# Patient Record
Sex: FEMALE | Race: WHITE
[De-identification: names, ages, dates, MRNs, and addresses within clinical notes are randomized per-mention and may not be internally consistent; named-entity substitution may affect disease eponyms.]

---

## 2020-11-25 LAB
ADD MANUAL DIFF: NO
APPEARANCE UR: (no result)
APTT PPP: YELLOW S
BARBITURATES UR QL SCN: NEGATIVE
BASOPHILS # BLD AUTO: 0 10^3/UL (ref 0–0.2)
BASOPHILS NFR BLD AUTO: 0.4 % (ref 0–2)
BILIRUB UR QL STRIP: NEGATIVE
EOSINOPHIL # BLD AUTO: 0.1 10^3/UL (ref 0–0.6)
EOSINOPHIL NFR BLD AUTO: 0.7 % (ref 0–6)
ERYTHROCYTE [DISTWIDTH] IN BLOOD BY AUTOMATED COUNT: 14.5 % (ref 11.5–14)
GLUCOSE UR STRIP-MCNC: NEGATIVE MG/DL
HCT VFR BLD CALC: 31.1 % (ref 36–47)
HGB BLD-MCNC: 10.5 G/DL (ref 12–15.5)
KETONES UR STRIP-MCNC: (no result) MG/DL
LYMPHOCYTES # BLD AUTO: 1.8 10^3/UL (ref 0.5–4.7)
LYMPHOCYTES NFR BLD AUTO: 19.6 % (ref 13–45)
MCH RBC QN AUTO: 26.7 PG (ref 27–33.4)
MCHC RBC AUTO-ENTMCNC: 33.8 G/DL (ref 32–36)
MCV RBC AUTO: 79 FL (ref 80–97)
METHADONE UR QL SCN: NEGATIVE
MONOCYTES # BLD AUTO: 0.6 10^3/UL (ref 0.1–1.4)
MONOCYTES NFR BLD AUTO: 6.3 % (ref 3–13)
NEUTROPHILS # BLD AUTO: 6.7 10^3/UL (ref 1.7–8.2)
NEUTS SEG NFR BLD AUTO: 73 % (ref 42–78)
NITRITE UR QL STRIP: NEGATIVE
PCP UR QL SCN: NEGATIVE
PH UR STRIP: 5 [PH] (ref 5–9)
PLATELET # BLD: 235 10^3/UL (ref 150–450)
PROT UR STRIP-MCNC: 30 MG/DL
RBC # BLD AUTO: 3.93 10^6/UL (ref 3.72–5.28)
SP GR UR STRIP: 1.04
TOTAL CELLS COUNTED % (AUTO): 100 %
URINE AMPHETAMINES SCREEN: NEGATIVE
URINE BENZODIAZEPINES SCREEN: NEGATIVE
URINE COCAINE SCREEN: NEGATIVE
URINE MARIJUANA (THC) SCREEN: NEGATIVE
UROBILINOGEN UR-MCNC: NEGATIVE MG/DL (ref ?–2)
WBC # BLD AUTO: 9.2 10^3/UL (ref 4–10.5)

## 2020-12-01 ENCOUNTER — HOSPITAL ENCOUNTER (INPATIENT)
Dept: HOSPITAL 62 - 2S | Age: 30
LOS: 2 days | Discharge: HOME | End: 2020-12-03
Attending: OBSTETRICS & GYNECOLOGY | Admitting: OBSTETRICS & GYNECOLOGY
Payer: OTHER GOVERNMENT

## 2020-12-01 DIAGNOSIS — K21.9: ICD-10-CM

## 2020-12-01 DIAGNOSIS — A60.00: ICD-10-CM

## 2020-12-01 DIAGNOSIS — M41.9: ICD-10-CM

## 2020-12-01 DIAGNOSIS — D64.9: ICD-10-CM

## 2020-12-01 DIAGNOSIS — Z79.899: ICD-10-CM

## 2020-12-01 DIAGNOSIS — Z23: ICD-10-CM

## 2020-12-01 DIAGNOSIS — Z3A.39: ICD-10-CM

## 2020-12-01 DIAGNOSIS — Z20.828: ICD-10-CM

## 2020-12-01 PROCEDURE — 86901 BLOOD TYPING SEROLOGIC RH(D): CPT

## 2020-12-01 PROCEDURE — 81001 URINALYSIS AUTO W/SCOPE: CPT

## 2020-12-01 PROCEDURE — 86900 BLOOD TYPING SEROLOGIC ABO: CPT

## 2020-12-01 PROCEDURE — 86850 RBC ANTIBODY SCREEN: CPT

## 2020-12-01 PROCEDURE — 94760 N-INVAS EAR/PLS OXIMETRY 1: CPT

## 2020-12-01 PROCEDURE — 87635 SARS-COV-2 COVID-19 AMP PRB: CPT

## 2020-12-01 PROCEDURE — 85027 COMPLETE CBC AUTOMATED: CPT

## 2020-12-01 PROCEDURE — C9803 HOPD COVID-19 SPEC COLLECT: HCPCS

## 2020-12-01 PROCEDURE — 80307 DRUG TEST PRSMV CHEM ANLYZR: CPT

## 2020-12-01 PROCEDURE — 94799 UNLISTED PULMONARY SVC/PX: CPT

## 2020-12-01 PROCEDURE — 59025 FETAL NON-STRESS TEST: CPT

## 2020-12-01 PROCEDURE — 36415 COLL VENOUS BLD VENIPUNCTURE: CPT

## 2020-12-01 PROCEDURE — 85025 COMPLETE CBC W/AUTO DIFF WBC: CPT

## 2020-12-01 PROCEDURE — 90707 MMR VACCINE SC: CPT

## 2020-12-01 RX ADMIN — KETOROLAC TROMETHAMINE SCH MG: 30 INJECTION, SOLUTION INTRAMUSCULAR at 13:45

## 2020-12-01 RX ADMIN — KETOROLAC TROMETHAMINE SCH MG: 30 INJECTION, SOLUTION INTRAMUSCULAR at 23:58

## 2020-12-01 RX ADMIN — OXYCODONE AND ACETAMINOPHEN PRN TAB: 5; 325 TABLET ORAL at 17:33

## 2020-12-01 RX ADMIN — DOCUSATE SODIUM SCH MG: 100 CAPSULE, LIQUID FILLED ORAL at 17:33

## 2020-12-01 NOTE — OPERATIVE REPORT
Operative Report


DATE OF SURGERY: 20


PREOPERATIVE DIAGNOSIS: IUP at 39 weeks, history of scoliosis, desires elective 




POSTOPERATIVE DIAGNOSIS: Same


OPERATION: Primary low transverse hysterotomy  section


SURGEON: MYA MENDOSA


ANESTHESIA: Spinal


COMPLICATIONS: 





None


ESTIMATED BLOOD LOSS: 150 cc


INTRAOPERATIVE FINDINGS: Female infant cephalic presentation Apgars 8 and 9


PROCEDURE: 








PROCEDURE IN DETAIL:


The patient was taken to the operating room, prepared and draped in a


normal sterile fashion in a supine position with a leftward tilt. A


transverse skin incision was made with a scalpel and carried through to


the underlying layer of fascia with the same scalpel. The fascia was


excised in the midline and extended laterally with Ralph. The fascia was


then dissected from the rectus muscle sharply with Ralph and the rectus


muscle was divided and the peritoneal cavity was entered sharply with the


same Metzenbaum. With good visualization of the bladder and the uterus


the bladder blade was inserted. The hysterotomy was nicked with a scalpel


and extended laterally with surgeon finger fraction. The infant was then


delivered atraumatically. The nose and mouth were suctioned with a


suction bulb, the cord was clamped and cut and handed off to awaiting


pediatricians. Cord blood was collected. The placenta was removed


manually. The uterus was exteriorized and cleared of clots and debris.


The hysterotomy was closed with 0 Monocryl in a running, locked fashion.


A second layer of the same suture was used to imbricate to ensure


hemostasis. The uterus was returned to the abdomen and peritoneal cavity


was cleared of clots and debris. The rectus muscle and peritoneum were


repaired with mattress stitch of 2-0 Chromic. The fascia was closed with


0-Vicryl. The subcutaneous layer was closed with plain catgut and the


skin was closed with 4-0 Vicryl. The patient tolerated the procedure


well. Sponge, lap, and needle counts correct x2 and the patient was taken


to recovery in stable condition.

## 2020-12-01 NOTE — BIRTH CERTIFICATE DATA
=================================================================

Birth Cert Data

=================================================================

Datetime Report Generated by CPN: 2020 13:59

   

   

=================================================================

BIRTH CERTIFICATE DATA

=================================================================

   

Delivery Provider:  Susie Cody, MD    (2020 12:18:Silvia

   Sales, RN)

   

=================================================================

Mother's Height

=================================================================

   

 50b. Height Inches:  66    (2020 12:53:QS system process)

   

=================================================================

Mother's Weight 

=================================================================

   

 51b. Weight at Delivery (lbs):  189    (2020 12:53:QS system

   process)

   

=================================================================

Onset of Labor

=================================================================

   

 56a. PROM >12 Hrs:  0.05    (2020 12:20:QS system process)

   

=================================================================



=================================================================

   

 57a. Induction of Labor:  N/A    (2020 12:20:Silvia Chino RN)

 57c. Non-Vertex Presentation A:  Vertex    (2020 12:20:Silvia Chino RN)

 57d. Steroids - Fetal Lung Mat:  None    (2020 12:20:Silvia Chino RN)

 57d. Steroids - Fetal Lung Mat:  Not Applicable    (2020

   12:20:Silvia Chino RN)

 57e. Antibiotics During Labor:  2020 10:45    (2020

   12:20:Silvia Chino RN)

 57f. Mat Chorio or Temp >100.4:  97.3    (2020 12:20:Silvia Chino RN)

 57g. Moderate/Heavy Meconium:  Clear    (2020 12:20:Silvia Chino RN)

 57h. Fetal Intolerance of Labor:  Other    (2020 12:20:Silvia Chino RN)

:  Scoliosis, prev. forceps delivery    (2020 12:20:Silvia Chino RN)

:  N/A    (2020 12:20:Silvia Chino RN)

 57i. Epidural/Spinal Anesthesia:  None    (2020 12:20:Silvia Chino RN)

   

=================================================================

Method of Delivery

=================================================================

   

 58a. Forceps - Unsuccessful A:  N/A    (2020 12:20:Silvia Chino RN)

 58b. Vacuum - Unsuccessful A:  Successful    (2020 12:20:Silvia Chino RN)

   

=================================================================

58c. Presentation at Birth

=================================================================

   

 58c. Presentation at Birth - A :  Vertex    (2020 12:20:Silvia

   Sales, RN)

 58c. Presentation at Birth - A :  N/A    (2020 12:20:Silvia

   Sales, RN)

 58c. Presentation at Birth - A :  Cephalic    (2020

   12:20:Silvia Sales, RN)

   

=================================================================

Final Route and Method of Del 

=================================================================

   

 58d. Baby A Route/Delivery:      (2020 12:20:Silvia

   Sales, RN)

 58e. Trial of Labor Attempted:  No    (2020 12:20:Silviasurinder Chino,

   RN)

 58e. Trial of Labor Attempted A:  N/A    (2020 12:20:Silvia

   Sales, RN)

 58e. Trial of Labor Attempted B:  N/A    (2020 12:20:Silvia

   Sales, RN)

   

=================================================================

Maternal Morbidity

=================================================================

   

 59b. 3rd or 4th Degree Lacs:  None    (2020 12:20:Silvia Sales,

   RN)

   

=================================================================



=================================================================

   

 Birthweight Baby A:  3260    (2020 12:20:Silvia Sales, RN)

 60a. Pounds :  7    (2020 12:20:QS system process)

 60b. Ounces:  3    (2020 12:20:QS system process)

   

=================================================================

61. GA at Delivery 

=================================================================

   

 Baby A:  39.0    (2020 12:20:Silvia Sales, RN)

:  Full Term- 39- 40.6 Weeks    (2020 12:20:QS system process)

   

=================================================================

62a. APGAR 5 Minute

=================================================================

   

 Baby A:  9    (2020 12:20:QS system process)

## 2020-12-01 NOTE — DELIVERY SUMMARY
=================================================================

Del Sum A-C

=================================================================

Datetime Report Generated by CPN: 2020 13:59

   

   

=================================================================

DELIVERY PERSONNEL

=================================================================

   

DELIVERY PERSONNEL:  [id number]

Delivery Doctor::  Susie Ross MD

CRNA::  Ted Price CRNA

Circulator::  Silvia Chino RN

 Nurse Practitioner::  STONE Kraft

Nursery Nurse::  Audrey Wren RN

Scrub Tech/CNA:  Tiffanie Marshall CST

Scrub Tech/CNA:  Charito Schuster, ST

   

=================================================================

MATERNAL INFORMATION

=================================================================

   

Delivery Anesthesia:  Spinal

Medications After Delivery:  Pitocin Drip 20 Units/1000ml NSS

Delivery QBL:  475

Maternal Complications:  Other

Complication Details:  Previous forcepts delivery, scoliosis

   

=================================================================

LABOR SUMMARY

=================================================================

   

EDC:  2020 00:00

No. Babies in Womb:  1

 Attempted:  No

Labor Anesthesia:  None

   

=================================================================

LABOR INFORMATION

=================================================================

   

Reason for Induction:  Not Applicable

Oxytocin:  N/A

Group B Beta Strep:  Positive

Antibiotics # of Doses:  1

Antibiotics Time of Last Dose:  2020 10:45

Name of Antibiotic Given:  Anceg 2g

Steroids Given:  None

Reason Steroids Not Administered:  Not Applicable

   

=================================================================

MEMBRANES

=================================================================

   

Membranes Rupture Method:  Artificial

Rupture of Membranes:  2020 11:13

Length of Rupture (hr):  0.05

Amniotic Fluid Color:  Clear

Amniotic Fluid Amount:  Moderate

Amniotic Fluid Odor:  Normal

   

=================================================================

STAGES OF LABOR

=================================================================

   

Stage 3 hr:  0

Stage 3 min:  1

   

=================================================================

VAGINAL DELIVERY

=================================================================

   

Episiotomy:  None

Laceration #1:  None

Laceration Extension #1:  N/A

Laceration Repair:  Not Applicable

Sharps Count Correct:  N/A

   

=================================================================

CSECTION DELIVERY

=================================================================

   

Primary Indication:  Other

Other Primary Indication:  Scoliosis, prev. forceps delivery

Secondary Indication:  N/A

CSection Urgency:  Scheduled

CSection Incidence:  Primary

Labor:  No Labor

Elective:  Elective

CSection Incision:  Lower Uterine Transverse

   

=================================================================

BABY A INFORMATION

=================================================================

   

Infant Delivery Date/Time:  2020 11:16

Method of Delivery:  

Nurse Controlled Delivery:  No

Born in Route :  No

:  N/A

Forceps:  N/A

Vacuum Extraction:  Successful

Shoulder Dystocia :  Yes

   

=================================================================

ASSISTED DELIVERY BABY A

=================================================================

   

Indication for Assisted Delivery:  Assistance in c/s

Catheter Prior to Procedure:  Yes

Position Vacuum/Forcep Apply:  Left Occipital Anterior

Vacuum Number of Pulls:  1

Vacuum Number of PopOffs:  0

Vacuum Maximum Pressure Obtained:  WNL

Total Time Vacuum Applied:  15 seconds

   

=================================================================

PRESENTATION/POSITION BABY A

=================================================================

   

Presentation:  Cephalic

Cephalic Presentation:  Vertex

Vertex Position:  Left Occipital Anterior

Breech Presentation:  N/A

   

=================================================================

PLACENTA INFORMATION BABY A

=================================================================

   

Placenta Delivery Time :  2020 11:17

Placenta Method of Delivery:  Manual Removal

Placenta Status:  Delivered

   

=================================================================

APGAR SCORES BABY A

=================================================================

   

Heart Rate 1 min:  >100 bpm

Resp Effort 1 min:  Good Cry

Reflex Irritability 1 min:  Cough or Sneeze or Pulls Away

Muscle Tone 1 min:  Active Motion

Color 1 min:  Blue/Pale

Resuscitation Effort 1 min:  Tactile Stimulation

APGAR SCORE 1 MIN:  8

Heart Rate 5 min:  >100 bpm

Resp Effort 5 min:  Good Cry

Reflex Irritability 5 min:  Cough or Sneeze or Pulls Away

Muscle Tone 5 min:  Active Motion

Color 5 min:  Body Pink, Extremities Blue

Resuscitation Effort 5 min:  N/A

APGAR SCORE 5 MIN:  9

   

=================================================================

INFANT INFORMATION BABY A

=================================================================

   

Gestational Age at Delivery:  39.0

Gestational Status:  Full Term- 39- 40.6 Weeks

Infant Outcome :  Liveborn

Infant Condition :  Stable

Infant Sex:  Female

   

=================================================================

IDENTIFICATION BABY A

=================================================================

   

Infant Verification Date/Time:  2020 11:22

ID Band Number:  [id number]

Mother's Name Verified:  Yes

Infant Medical Record Number:  [id number]

RN Verifying Infant:  M. Sales, RN

Additional Verifying Personnel:  T. Siena, RN

   

=================================================================

WEIGHT/LENGTH BABY A

=================================================================

   

Infant Birthweight (gm):  3260

Infant Weight (lb):  7

Infant Weight (oz):  3

Infant Length (in):  19.50

Infant Length (cm):  49.53

   

=================================================================

CORD INFORMATION BABY A

=================================================================

   

No. Cord Vessels:  3

Nuchal Cord :  Around Neck x1, Loose

Cord Blood Taken:  Yes-For Storage (Mom's Blood type +)

Infant Suction:  Mouth; Nose

   

=================================================================

ASSESSMENT BABY A

=================================================================

   

Skin to Skin:  Yes

   

=================================================================

BABY B INFORMATION

=================================================================

   

 :  N/A

## 2020-12-02 LAB
ERYTHROCYTE [DISTWIDTH] IN BLOOD BY AUTOMATED COUNT: 15.6 % (ref 11.5–14)
HCT VFR BLD CALC: 28.3 % (ref 36–47)
HGB BLD-MCNC: 9.4 G/DL (ref 12–15.5)
MCH RBC QN AUTO: 26.5 PG (ref 27–33.4)
MCHC RBC AUTO-ENTMCNC: 33.2 G/DL (ref 32–36)
MCV RBC AUTO: 80 FL (ref 80–97)
PLATELET # BLD: 199 10^3/UL (ref 150–450)
RBC # BLD AUTO: 3.55 10^6/UL (ref 3.72–5.28)
WBC # BLD AUTO: 14.4 10^3/UL (ref 4–10.5)

## 2020-12-02 RX ADMIN — OXYCODONE AND ACETAMINOPHEN PRN TAB: 5; 325 TABLET ORAL at 15:08

## 2020-12-02 RX ADMIN — IBUPROFEN SCH MG: 800 TABLET, FILM COATED ORAL at 11:30

## 2020-12-02 RX ADMIN — Medication SCH CAP: at 11:30

## 2020-12-02 RX ADMIN — KETOROLAC TROMETHAMINE SCH MG: 30 INJECTION, SOLUTION INTRAMUSCULAR at 06:50

## 2020-12-02 RX ADMIN — DOCUSATE SODIUM SCH MG: 100 CAPSULE, LIQUID FILLED ORAL at 18:05

## 2020-12-02 RX ADMIN — IBUPROFEN SCH MG: 800 TABLET, FILM COATED ORAL at 18:06

## 2020-12-02 RX ADMIN — OXYCODONE AND ACETAMINOPHEN PRN TAB: 5; 325 TABLET ORAL at 04:47

## 2020-12-02 RX ADMIN — DOCUSATE SODIUM SCH MG: 100 CAPSULE, LIQUID FILLED ORAL at 11:30

## 2020-12-02 NOTE — PDOC PROGRESS REPORT
Subjective-OB


Progress Note for:: 20


Subjective: 





29yo G2 now P2 s/p p c/s ppd1. Pt ambulating, voiding, feeding without 

difficulty. Reports pain well controlled with medication. Denies concerns at 

this time. 





Physical Exam (OB)


Vital Signs: 


                                        











Temp Pulse Resp BP Pulse Ox


 


 98.4 F   111 H  15   137/84 H  99 


 


 20 08:00  20 08:00  20 08:00  20 08:00  20 08:00








                                 Intake & Output











 20





 06:59 06:59 06:59


 


Intake Total  980 450


 


Output Total  2602 


 


Balance  -1622 450


 


Weight  86.18 kg 














- General


General Appearance: Appears well


In distress: None





- PIH/Pre-Eclampsia


DTR's: 1 +


Clonus: Negative


Headache: Absent


Epigastric Pain: Yes


Visual Changes: No





- 


Dressing Removed: No - previously removed


Incision: Open


Closure Type: Steri-Strips





- Maternal Morbidity


59. Maternal Morbidity (serious complications experinced by the mother 

associated with labor and delivery: None of the above





- Lochia


Lochia Amount: Small 10-25 ml


Lochia Color: Rubra/Red





- Abdomen


Description: Firm, Soft


Hernia Present: No


Fundal Description: Firm, Midline


Fundal Height: u/u - u/2





- Respiratory


Respiratory Status: No respiratory distress





- Extremities


Upper extremity: Normal inspection





- Neurological


Cognition: Normal


Orientation: AAOx4





- Psychological


Associated symptoms: Normal mood, Flat affect





Objective-Diagnostic


Laboratory: 


                                        





                                 20 07:21 





                                        











  20





  07:21


 


WBC  14.4 H


 


RBC  3.55 L


 


Hgb  9.4 L


 


Hct  28.3 L


 


MCV  80


 


MCH  26.5 L


 


MCHC  33.2


 


RDW  15.6 H


 


Plt Count  199














Assessment and Plan(PN)





- Assessment and Plan


(1) Anemia complicating pregnancy, third trimester


Is this a current diagnosis for this admission?: Yes   


Plan: 


 increase dietary iron and FeSO4 supplementation








(2) S/P primary low transverse 


Is this a current diagnosis for this admission?: Yes   


Plan: 


Routine pp care








(3) Scoliosis


Qualifiers: 


   Scoliosis type: unspecified scoliosis   Spinal region: unspecified   

Qualified Code(s): M41.9 - Scoliosis, unspecified   


Is this a current diagnosis for this admission?: Yes   


Plan: 


delivered








- Time Spent with Patient


Time with patient: Less than 15 minutes


Medications reviewed and adjusted accordingly: Yes





- Disposition


Anticipated Discharge Disposition: Home, Self Care


Anticipated Discharge Timeframe: within 24 hours

## 2020-12-03 VITALS — DIASTOLIC BLOOD PRESSURE: 76 MMHG | SYSTOLIC BLOOD PRESSURE: 127 MMHG

## 2020-12-03 PROCEDURE — 3E0234Z INTRODUCTION OF SERUM, TOXOID AND VACCINE INTO MUSCLE, PERCUTANEOUS APPROACH: ICD-10-PCS | Performed by: OBSTETRICS & GYNECOLOGY

## 2020-12-03 RX ADMIN — IBUPROFEN SCH MG: 800 TABLET, FILM COATED ORAL at 00:17

## 2020-12-03 RX ADMIN — IBUPROFEN SCH MG: 800 TABLET, FILM COATED ORAL at 11:29

## 2020-12-03 RX ADMIN — DOCUSATE SODIUM SCH MG: 100 CAPSULE, LIQUID FILLED ORAL at 09:38

## 2020-12-03 RX ADMIN — Medication SCH CAP: at 09:38

## 2020-12-03 RX ADMIN — IBUPROFEN SCH MG: 800 TABLET, FILM COATED ORAL at 05:07

## 2020-12-03 NOTE — PDOC DISCHARGE SUMMARY
Impression





- Admit/DC Date/PCP


Admission Date/Primary Care Provider: 


  20 07:41





  MYA MENDOSA MD





Discharge Date: 20





- Discharge Diagnosis


(1) Anemia complicating pregnancy, third trimester


Is this a current diagnosis for this admission?: Yes   





(2) S/P primary low transverse 


Is this a current diagnosis for this admission?: Yes   





(3) Scoliosis


Is this a current diagnosis for this admission?: Yes   





- Additional Information


Resuscitation Status: Full Code


Discharge Diet: Regular


Discharge Activity: Balance Activity w/Rest, No Lifting Over 10 Pounds, No 

Lifting/Push/Pulling, Pelvic Rest, No tub bath


Referrals: 


MYA MENDOSA MD [Primary Care Provider] - 


Prescriptions: 


Acetaminophen with Codeine [Tylenol #3 Tablet] 1 each PO Q4HP PRN #30 tablet


 PRN Reason: For Pain Scale 3-5


Ibuprofen [Motrin 800 mg Tablet] 800 mg PO Q8HP PRN #60 tablet


 PRN Reason: 


Home Medications: 








Esomeprazole Magnesium [Nexium 24Hr] 20 mg PO DAILY 20 


Levothyroxine Sodium 50 mcg PO DAILY 20 


Prenat 115/Iron Fum/Folic/Dss [Prenatal 19 Tablet] 1 each PO DAILY 20 


Acetaminophen with Codeine [Tylenol #3 Tablet] 1 each PO Q4HP PRN #30 tablet 

20 


Ibuprofen [Motrin 800 mg Tablet] 800 mg PO Q8HP PRN #60 tablet 20 











HPI


Gestational Age: 39.0


Reason(s) for Admission: Ceasarean Section-Primary, Medical Complications


Prenatal Procedures: None





Hospital Course


59. Maternal Morbidity (serious complications experinced by the mother 

associated with labor and delivery: None of the above





Results


Laboratory Results: 


                                        











WBC  14.4 10^3/uL (4.0-10.5)  H  20  07:21    


 


RBC  3.55 10^6/uL (3.72-5.28)  L  20  07:21    


 


Hgb  9.4 g/dL (12.0-15.5)  L  20  07:21    


 


Hct  28.3 % (36.0-47.0)  L  20  07:21    


 


MCV  80 fl (80-97)   20  07:21    


 


MCH  26.5 pg (27.0-33.4)  L  20  07:21    


 


MCHC  33.2 g/dL (32.0-36.0)   20  07:21    


 


RDW  15.6 % (11.5-14.0)  H  20  07:21    


 


Plt Count  199 10^3/uL (150-450)   20  07:21    


 


Lymph % (Auto)  19.6 % (13-45)   20  11:29    


 


Mono % (Auto)  6.3 % (3-13)   20  11:29    


 


Eos % (Auto)  0.7 % (0-6)   20  11:29    


 


Baso % (Auto)  0.4 % (0-2)   20  11:29    


 


Absolute Neuts (auto)  6.7 10^3/uL (1.7-8.2)   20  11:29    


 


Absolute Lymphs (auto)  1.8 10^3/uL (0.5-4.7)   20  11:29    


 


Absolute Monos (auto)  0.6 10^3/uL (0.1-1.4)   20  11:29    


 


Absolute Eos (auto)  0.1 10^3/uL (0.0-0.6)   20  11:29    


 


Absolute Basos (auto)  0.0 10^3/uL (0.0-0.2)   20  11:29    


 


Seg Neutrophils %  73.0 % (42-78)   20  11:29    


 


Urine Color  YELLOW   20  11:35    


 


Urine Appearance  SLIGHTLY-CLOUDY   20  11:35    


 


Urine pH  5.0  (5.0-9.0)   20  11:35    


 


Ur Specific Gravity  1.036   20  11:35    


 


Urine Protein  30 mg/dL (NEGATIVE)  H  20  11:35    


 


Urine Glucose (UA)  NEGATIVE mg/dL (NEGATIVE)   20  11:35    


 


Urine Ketones  TRACE mg/dL (NEGATIVE)  H  20  11:35    


 


Urine Blood  NEGATIVE  (NEGATIVE)   20  11:35    


 


Urine Nitrite  NEGATIVE  (NEGATIVE)   20  11:35    


 


Urine Bilirubin  NEGATIVE  (NEGATIVE)   20  11:35    


 


Urine Urobilinogen  NEGATIVE mg/dL (<2.0)   20  11:35    


 


Ur Leukocyte Esterase  SMALL  (NEGATIVE)  H  20  11:35    


 


Urine WBC (Auto)  3 /HPF  20  11:35    


 


Urine RBC (Auto)  2 /HPF  20  11:35    


 


Urine Bacteria (Auto)  TRACE /HPF  20  11:35    


 


Squamous Epi Cells Auto  7 /HPF  20  11:35    


 


Urine Mucus (Auto)  MOD /LPF  20  11:35    


 


Urine Ascorbic Acid  NEGATIVE  (NEGATIVE)   20  11:35    


 


Urine Opiates Screen  NEGATIVE   20  11:35    


 


Urine Methadone Screen  NEGATIVE   20  11:35    


 


Ur Barbiturates Screen  NEGATIVE   20  11:35    


 


Ur Phencyclidine Scrn  NEGATIVE   20  11:35    


 


Ur Amphetamines Screen  NEGATIVE   20  11:35    


 


U Benzodiazepines Scrn  NEGATIVE   20  11:35    


 


Urine Cocaine Screen  NEGATIVE   20  11:35    


 


U Marijuana (THC) Screen  NEGATIVE   20  11:35    


 


COVID-19 Source  See comment   20  11:31    


 


COVID-19 ([facility])  Not Detected  (Not Detect)   20  11:31    


 


Blood Type  A POSITIVE   20  16:32    


 


Antibody Screen  NEGATIVE   20  16:32    














Plan


Plan of Treatment: 


f/u at [facility] for incision check


Time Spent: Less than 30 Minutes